# Patient Record
Sex: FEMALE | Race: WHITE | NOT HISPANIC OR LATINO | Employment: FULL TIME | URBAN - METROPOLITAN AREA
[De-identification: names, ages, dates, MRNs, and addresses within clinical notes are randomized per-mention and may not be internally consistent; named-entity substitution may affect disease eponyms.]

---

## 2018-04-24 DIAGNOSIS — M22.2X2 PATELLOFEMORAL DISORDER OF LEFT KNEE: Primary | ICD-10-CM

## 2018-04-24 DIAGNOSIS — M25.562 PAIN, JOINT, LOWER LEG, LEFT: ICD-10-CM

## 2018-05-02 ENCOUNTER — CLINICAL SUPPORT (OUTPATIENT)
Dept: REHABILITATION | Facility: HOSPITAL | Age: 53
End: 2018-05-02
Payer: COMMERCIAL

## 2018-05-02 DIAGNOSIS — M25.562 ACUTE PAIN OF LEFT KNEE: ICD-10-CM

## 2018-05-02 DIAGNOSIS — M62.81 MUSCLE WEAKNESS: ICD-10-CM

## 2018-05-02 PROCEDURE — 97110 THERAPEUTIC EXERCISES: CPT | Mod: PN

## 2018-05-02 PROCEDURE — 97161 PT EVAL LOW COMPLEX 20 MIN: CPT | Mod: PN

## 2018-05-02 NOTE — PROGRESS NOTES
Physical Therapy Evaluation    Name: Marlee Jones  Clinic Number: [id number]        Diagnosis:   Encounter Diagnoses   Name Primary?    Acute pain of left knee     Muscle weakness      Physician: Chris Garcia MD  Treatment Orders: PT Eval and Treat    Past Medical History:   Diagnosis Date    DVT (deep venous thrombosis)     Heterozygous factor V Leiden mutation      Current Outpatient Prescriptions   Medication Sig    LOCOID LIPOCREAM 0.1 % Crea AAA bid    sertraline (ZOLOFT) 25 MG tablet Take 1 tablet (25 mg total) by mouth once daily.     No current facility-administered medications for this visit.      Review of patient's allergies indicates:  No Known Allergies  Precautions: standard    Evaluation Date: 5/2/18        Subjective     Onset/ROSA: pt fell 3 weeks ago.  Walking dogs after it rained.  Her dog pulled her backwards.  She thinks she twisted her L knee.  She went to outside MD and had x-rays (no fracture or tears per pt report).  Anterior L knee pain, occasionally medially    Prior Level of Function: I with all activities  Social History: pt lives with  in 2 stiory house, but BR on 1st floor, 1 curb step to enter back door    Pt reports difficulty sleeping, typically sleeps on side    History of Present Illness: Marlee is a 52 y.o. female that presents to [facility] secondary to L knee pain s/p fall 3 weeks ago.     Pain: current 2/10, worst 8/10, best 1/10  Radicular symptoms: pt denies  Aggravating factors: stairs, sit to stand, squatting  Easing factors: advil, ice (1-2x/day)    Pts goals: return to [employer], yoga, stairs without pain.  Be able to susan shoes, get in/out of tub          Objective     Observation: pt is pleasant and cooperative      Range of Motion:   Knee Left active Right Active   Flexion 131 140   Extension 3 hyperext 6 hyperext           Lower Extremity Strength  Right LE  Left LE    Knee extension: 5/5 Knee extension: 3/5   Knee flexion: 5/5 Knee  flexion: 4+/5   Hip flexion: 5/5 Hip flexion: 4+/5   Hip extension:  4/5 Hip extension: 4/5   Hip abduction: 4+/5 Hip abduction: 3+/5   Hip adduction: 5/5 Hip adduction 5/5   Ankle dorsiflexion: 5/5 Ankle dorsiflexion: 5/5   Ankle plantarflexion: 5/5 Ankle plantarflexion: 5/5           Special Tests:   Left Right   Valgus Stress Test - -   Varus Stress test - -       Function:  - Squat: pain, crepitus, R weight shift   - Sit <--> Stand:I, but pt with pain and report of stiffness   - Bed Mobility: I        Joint Mobility:   Patellar WNL B        Sensation: grossly intact to light touch    Flexibility:    Josephine's test: R =( -) ; L = (-)     Edema: min edema to L knee    Functional Limitations Reports - G Codes  Category: mobility  CMS Impairment/Limitation/Restriction for FOTO Lower Leg (w/o Knee) Survey  Status Limitation G-Code CMS Severity Modifier  Intake 64% 36% Current Status CJ - At least 20 percent but less than 40 percent  Predicted 82% 18% Goal Status+ CI - At least 1 percent but less than 20 percent    PT Evaluation Completed? Yes  Discussed Plan of Care with patient: Yes    TREATMENT:  Marlee instructed in and performed therapeutic exercises to develop strength and endurance, flexibility for 15 minutes including:  SL clams x10 RTB  Bridges with Y sport loop for hip abd iso x10   pilates stepper 2 springs L3 2x10 (standing on 2in step)  Side steps with Y sport loop 2 laps  SLR x10     Pt. Received cold pack x 10 min. To L knee. Following treatment.    HEP provided: pt given verbal and written instruction for all ex listed above  Instructed pt. Regarding: Proper technique with all exercises. Pt demo good understanding of the education provided. Marlee demonstrated good return demonstration of activities.      Assessment     This is a 52 y.o. female referred to outpatient physical therapy and presents with a medical diagnosis of Patellofemoral disorder of left knee and demonstrates limitations as described  in the problem list. Pt will benefit from physcial therapy services in order to maximize pain free and/or functional use of left knee. The following goals were discussed with the patient and patient is in agreement with them as to be addressed in the treatment plan.     History  Co-morbidities and personal factors that may impact the plan of care Examination  Body Structures and Functions, activity limitations and participation restrictions that may impact the plan of care    Clinical Presentation   Co-morbidities:   none        Personal Factors:   no deficits Body Regions:   lower extremities    Body Systems:    ROM  strength  balance  gait  transfers  transitions            Participation Restrictions:   Stairs, squats     Activity limitations:   Learning and applying knowledge  no deficits    General Tasks and Commands  no deficits    Communication  no deficits    Mobility  no deficits    Self care  dressing    Domestic Life  doing house work (cleaning house, washing dishes, laundry)    Interactions/Relationships  no deficits    Life Areas  no deficits    Community and Social Life  community life  recreation and leisure         stable and uncomplicated                      low      Decision Making/ Complexity Score:  low         Medical necessity is demonstrated by the following IMPAIRMENTS/PROBLEM LIST:   1)Increase in L knee pain level limiting function   2)decreased LE strength   3)decreased L knee ROM   4)difficulty sleeping   5)Lack of HEP    GOALS:   Short Term Goals:  4 weeks  1. Pt will report decreased knee pain to 4/10 in order to increase tolerance for stairs.  2. Pt will increase L knee flexion ROM by at least 5-10 degrees in order to show improved functional mobility.  3. Increased LE strength by 1/3 muscle grade in all deficient planes for increased ease with sit to stand  4. Pt will report ability to sleep through the night without pain for increased tolerance for daily activities.  5. Pt will be  independent and consistent with issued HEP in order to show carryover between therapy sessions.    Long Term Goals: 12 weeks  6. Pt will report decreased knee pain to 0-2/10 in order to increase tolerance for exercising at gym.  7. Pt will increase L knee flexion ROM by at least 5-10 degrees in order to show improved functional mobility.  8. Increased LE strength to 4+/5 to 5/5 in all deficient planes for increased ease with floor to stand transfers.  9. Pt will report ability to sleep through the night without pain for increased tolerance for daily activities.  10. Pt will be independent and consistent with issued HEP in order to show carryover between therapy sessions.  11. Pt will perform floor to stand transfer independently without L knee pain.  Plan     Pt will be treated by physical therapy 1-3 times a week to every 2 weeks for 12 weeks for Pt Education, HEP, therapeutic exercises, neuromuscular re-education, joint mobilizations, dry needling, modalities prn to achieve established goals. Marlee may at times be seen by a PTA as part of the Rehab Team.     Cont PT for  12   weeks.       I certify the need for these services furnished under this plan of treatment and while under my care.______________________________ Physician/Referring Practitioner  Date of Signature

## 2018-05-03 NOTE — PLAN OF CARE
Physical Therapy Evaluation    Name: Marlee Jones  Clinic Number: [id number]        Diagnosis:   Encounter Diagnoses   Name Primary?    Acute pain of left knee     Muscle weakness      Physician: Chris Garcia MD  Treatment Orders: PT Eval and Treat    Past Medical History:   Diagnosis Date    DVT (deep venous thrombosis)     Heterozygous factor V Leiden mutation      Current Outpatient Prescriptions   Medication Sig    LOCOID LIPOCREAM 0.1 % Crea AAA bid    sertraline (ZOLOFT) 25 MG tablet Take 1 tablet (25 mg total) by mouth once daily.     No current facility-administered medications for this visit.      Review of patient's allergies indicates:  No Known Allergies  Precautions: standard    Evaluation Date: 5/2/18        Subjective     Onset/ROSA: pt fell 3 weeks ago.  Walking dogs after it rained.  Her dog pulled her backwards.  She thinks she twisted her L knee.  She went to outside MD and had x-rays (no fracture or tears per pt report).  Anterior L knee pain, occasionally medially    Prior Level of Function: I with all activities  Social History: pt lives with  in 2 stiory house, but BR on 1st floor, 1 curb step to enter back door    Pt reports difficulty sleeping, typically sleeps on side    History of Present Illness: Marlee is a 52 y.o. female that presents to [facility] secondary to L knee pain s/p fall 3 weeks ago.     Pain: current 2/10, worst 8/10, best 1/10  Radicular symptoms: pt denies  Aggravating factors: stairs, sit to stand, squatting  Easing factors: advil, ice (1-2x/day)    Pts goals: return to [employer], yoga, stairs without pain.  Be able to susan shoes, get in/out of tub          Objective     Observation: pt is pleasant and cooperative      Range of Motion:   Knee Left active Right Active   Flexion 131 140   Extension 3 hyperext 6 hyperext           Lower Extremity Strength  Right LE  Left LE    Knee extension: 5/5 Knee extension: 3/5   Knee flexion: 5/5 Knee  flexion: 4+/5   Hip flexion: 5/5 Hip flexion: 4+/5   Hip extension:  4/5 Hip extension: 4/5   Hip abduction: 4+/5 Hip abduction: 3+/5   Hip adduction: 5/5 Hip adduction 5/5   Ankle dorsiflexion: 5/5 Ankle dorsiflexion: 5/5   Ankle plantarflexion: 5/5 Ankle plantarflexion: 5/5           Special Tests:   Left Right   Valgus Stress Test - -   Varus Stress test - -       Function:  - Squat: pain, crepitus, R weight shift   - Sit <--> Stand:I, but pt with pain and report of stiffness   - Bed Mobility: I        Joint Mobility:   Patellar WNL B        Sensation: grossly intact to light touch    Flexibility:    Josephine's test: R =( -) ; L = (-)     Edema: min edema to L knee    Functional Limitations Reports - G Codes  Category: mobility  CMS Impairment/Limitation/Restriction for FOTO Lower Leg (w/o Knee) Survey  Status Limitation G-Code CMS Severity Modifier  Intake 64% 36% Current Status CJ - At least 20 percent but less than 40 percent  Predicted 82% 18% Goal Status+ CI - At least 1 percent but less than 20 percent    PT Evaluation Completed? Yes  Discussed Plan of Care with patient: Yes    TREATMENT:  Marlee instructed in and performed therapeutic exercises to develop strength and endurance, flexibility for 15 minutes including:  SL clams x10 RTB  Bridges with Y sport loop for hip abd iso x10   pilates stepper 2 springs L3 2x10 (standing on 2in step)  Side steps with Y sport loop 2 laps  SLR x10     Pt. Received cold pack x 10 min. To L knee. Following treatment.    HEP provided: pt given verbal and written instruction for all ex listed above  Instructed pt. Regarding: Proper technique with all exercises. Pt demo good understanding of the education provided. Marlee demonstrated good return demonstration of activities.      Assessment     This is a 52 y.o. female referred to outpatient physical therapy and presents with a medical diagnosis of Patellofemoral disorder of left knee and demonstrates limitations as described  in the problem list. Pt will benefit from physcial therapy services in order to maximize pain free and/or functional use of left knee. The following goals were discussed with the patient and patient is in agreement with them as to be addressed in the treatment plan.     History  Co-morbidities and personal factors that may impact the plan of care Examination  Body Structures and Functions, activity limitations and participation restrictions that may impact the plan of care    Clinical Presentation   Co-morbidities:   none        Personal Factors:   no deficits Body Regions:   lower extremities    Body Systems:    ROM  strength  balance  gait  transfers  transitions            Participation Restrictions:   Stairs, squats     Activity limitations:   Learning and applying knowledge  no deficits    General Tasks and Commands  no deficits    Communication  no deficits    Mobility  no deficits    Self care  dressing    Domestic Life  doing house work (cleaning house, washing dishes, laundry)    Interactions/Relationships  no deficits    Life Areas  no deficits    Community and Social Life  community life  recreation and leisure         stable and uncomplicated                      low      Decision Making/ Complexity Score:  low         Medical necessity is demonstrated by the following IMPAIRMENTS/PROBLEM LIST:   1)Increase in L knee pain level limiting function   2)decreased LE strength   3)decreased L knee ROM   4)difficulty sleeping   5)Lack of HEP    GOALS:   Short Term Goals:  4 weeks  1. Pt will report decreased knee pain to 4/10 in order to increase tolerance for stairs.  2. Pt will increase L knee flexion ROM by at least 5-10 degrees in order to show improved functional mobility.  3. Increased LE strength by 1/3 muscle grade in all deficient planes for increased ease with sit to stand  4. Pt will report ability to sleep through the night without pain for increased tolerance for daily activities.  5. Pt will be  independent and consistent with issued HEP in order to show carryover between therapy sessions.    Long Term Goals: 12 weeks  6. Pt will report decreased knee pain to 0-2/10 in order to increase tolerance for exercising at gym.  7. Pt will increase L knee flexion ROM by at least 5-10 degrees in order to show improved functional mobility.  8. Increased LE strength to 4+/5 to 5/5 in all deficient planes for increased ease with floor to stand transfers.  9. Pt will report ability to sleep through the night without pain for increased tolerance for daily activities.  10. Pt will be independent and consistent with issued HEP in order to show carryover between therapy sessions.  11. Pt will perform floor to stand transfer independently without L knee pain.  Plan     Pt will be treated by physical therapy 1-3 times a week to every 2 weeks for 12 weeks for Pt Education, HEP, therapeutic exercises, neuromuscular re-education, joint mobilizations, dry needling, modalities prn to achieve established goals. Marlee may at times be seen by a PTA as part of the Rehab Team.     Cont PT for  12   weeks.       I certify the need for these services furnished under this plan of treatment and while under my care.______________________________ Physician/Referring Practitioner  Date of Signature

## 2018-05-07 ENCOUNTER — CLINICAL SUPPORT (OUTPATIENT)
Dept: REHABILITATION | Facility: HOSPITAL | Age: 53
End: 2018-05-07
Payer: COMMERCIAL

## 2018-05-07 DIAGNOSIS — M62.81 MUSCLE WEAKNESS: ICD-10-CM

## 2018-05-07 DIAGNOSIS — M25.562 ACUTE PAIN OF LEFT KNEE: ICD-10-CM

## 2018-05-07 PROCEDURE — 97110 THERAPEUTIC EXERCISES: CPT | Mod: PN

## 2018-05-07 NOTE — PROGRESS NOTES
Name: Marlee Jones  Clinic Number: [id number]  Date of Treatment: 05/07/2018   Diagnosis:   Encounter Diagnoses   Name Primary?    Acute pain of left knee     Muscle weakness        Visit number: 2  Start date: 5/2/2018  POC End date: 7/25/2018    Time in: 2:00  Time Out: 3:08  Total Treatment Time: 55  1:1 Time: 30    Precautions: standard    Subjective:    Marlee reports slow improvement of symptoms into the L LE and knee. She states she would like her progress to be faster, but has noticed an improvement. Most discomfort with sleeping and stairs/squatting. Patient reports their pain to be aching/10 on a 0-10 scale with 0 being no pain and 10 being the worst pain imaginable.    Objective    Marlee received therapeutic exercises to develop strength, endurance and flexibility for 55 minutes including:     SL clams 2x10 RTB  Bridges with RTB for hip abd iso 3x10   pilates stepper 2 springs L4 2x10 each (standing on 2in step)  Side steps with Y sport loop 2 laps  SLR 2x10  ADDED:  SL hip abd x10 each  Shuttle B LE 2.5 bands 2x10  Shuttle 2-1 alternating  Sit-stand from chair + 2 Airex pads x 10     Pt. Received cold pack x 10 min. To L knee. Following treatment.    Written Home Exercises Provided: Patient to continue with current written HEP. Educated patient on proper body mechanics and posture with sit to stand transfers including foot position and sitting on edge of chair.  Pt demo good understanding of the education provided. Marlee demonstrated good return demonstration of activities.     Assessment:     Marlee tolerated treatment well this date overall. No onset of L knee pain except with end range of sit to stand from chair. Mild apprehension with shuttle exercises, improved once familiar with motion of the exercises. Improved quad recruitment with shuttle press, weakness during eccentric motion. Patient will benefit from continued strengthening.  Pt will continue to benefit from skilled PT  intervention to address the remaining functional deficits, maximize pt's level of independence in the home and community environment, as well as Pain limits function of effected part for some activities, Unable to participate fully in daily activities and Weakness.  Patient is making good progress towards established goals.    Short Term Goals:  4 weeks  1. Pt will report decreased knee pain to 4/10 in order to increase tolerance for stairs.  2. Pt will increase L knee flexion ROM by at least 5-10 degrees in order to show improved functional mobility.  3. Increased LE strength by 1/3 muscle grade in all deficient planes for increased ease with sit to stand  4. Pt will report ability to sleep through the night without pain for increased tolerance for daily activities.  5. Pt will be independent and consistent with issued HEP in order to show carryover between therapy sessions.     Long Term Goals: 12 weeks  6. Pt will report decreased knee pain to 0-2/10 in order to increase tolerance for exercising at gym.  7. Pt will increase L knee flexion ROM by at least 5-10 degrees in order to show improved functional mobility.  8. Increased LE strength to 4+/5 to 5/5 in all deficient planes for increased ease with floor to stand transfers.  9. Pt will report ability to sleep through the night without pain for increased tolerance for daily activities.  10. Pt will be independent and consistent with issued HEP in order to show carryover between therapy sessions.  Pt will perform floor to stand transfer independently without L knee pain.    Plan:  Continue with established Plan of Care towards PT goals.

## 2018-05-09 ENCOUNTER — CLINICAL SUPPORT (OUTPATIENT)
Dept: REHABILITATION | Facility: HOSPITAL | Age: 53
End: 2018-05-09
Payer: COMMERCIAL

## 2018-05-09 DIAGNOSIS — M62.81 MUSCLE WEAKNESS: ICD-10-CM

## 2018-05-09 DIAGNOSIS — M25.562 ACUTE PAIN OF LEFT KNEE: ICD-10-CM

## 2018-05-09 PROCEDURE — 97110 THERAPEUTIC EXERCISES: CPT | Mod: PN

## 2018-05-09 NOTE — PROGRESS NOTES
"Name: Marlee Jones  Clinic Number: [id number]  Date of Treatment: 05/09/2018   Diagnosis:   Encounter Diagnoses   Name Primary?    Acute pain of left knee     Muscle weakness        Visit number: 3  Start date: 5/2/2018  POC End date: 7/25/2018    Time in: 2:03  Time Out: 3:14  Total Treatment Time: 60  1:1 Time: 60    Precautions: standard    Subjective:    Marlee reports continued improvement of symptoms in the L knee. Had some soreness following previous session yet improved next day. Got up twice from chair today without thinking about knee pain or discomfort. Patient reports their pain to be less aching/10 on a 0-10 scale with 0 being no pain and 10 being the worst pain imaginable.    Objective    Marlee received therapeutic exercises to develop strength, endurance and flexibility for 55 minutes including:     SL clams 2x10 RTB  Bridges with RTB for hip abd iso 3x10   Supine active L heel slides with slider x 15  Quad set with towel roll under knee for VMO 15 x 5 sec  SAQ over 6" roll x 10 (discomfort sub-patella)  SLR 2x10 (towel roll under knee for quad activation)  SL hip abd 2x10 each  Prone SLR (pillow under hips  pilates stepper 2 springs L4 2x10 each (standing on 2in step)  Side steps with Y sport loop 2 laps  Shuttle B LE 3 bands 2x10  Shuttle 2-1 with 2 bands 2x10 L  Sit-stand from chair + 2 Airex pads x 10     Patient received manual techniques including medial patellar glides and STM to distal ITB x 5 min    Pt. Received cold pack x 10 min. To L knee. Following treatment.    Written Home Exercises Provided: Patient to continue with current written HEP. Educated patient on proper body mechanics and posture with sit to stand transfers including foot position and sitting on edge of chair.  Pt demo good understanding of the education provided. Marlee demonstrated good return demonstration of activities.     Assessment:     Pt demonstrated good tolerance to treatment without onset of pain or " soreness in the L knee. Mild lateral tracking of patella with quad set yet no relief with manual blocking of patella. Slight difficulty maintaining quad activation and full knee extension with SLR. Greater tolerance to sit to stands with 2 Airex pads. Patient will benefit from continued quad and hip strengthening.  Pt will continue to benefit from skilled PT intervention to address the remaining functional deficits, maximize pt's level of independence in the home and community environment, as well as Pain limits function of effected part for some activities, Unable to participate fully in daily activities and Weakness.  Patient is making good progress towards established goals.    Short Term Goals:  4 weeks  1. Pt will report decreased knee pain to 4/10 in order to increase tolerance for stairs.  2. Pt will increase L knee flexion ROM by at least 5-10 degrees in order to show improved functional mobility.  3. Increased LE strength by 1/3 muscle grade in all deficient planes for increased ease with sit to stand  4. Pt will report ability to sleep through the night without pain for increased tolerance for daily activities.  5. Pt will be independent and consistent with issued HEP in order to show carryover between therapy sessions.     Long Term Goals: 12 weeks  6. Pt will report decreased knee pain to 0-2/10 in order to increase tolerance for exercising at gym.  7. Pt will increase L knee flexion ROM by at least 5-10 degrees in order to show improved functional mobility.  8. Increased LE strength to 4+/5 to 5/5 in all deficient planes for increased ease with floor to stand transfers.  9. Pt will report ability to sleep through the night without pain for increased tolerance for daily activities.  10. Pt will be independent and consistent with issued HEP in order to show carryover between therapy sessions.  Pt will perform floor to stand transfer independently without L knee pain.    Plan:  Continue with established  Plan of Care towards PT goals.

## 2018-05-09 NOTE — PATIENT INSTRUCTIONS
Strengthening: Quadriceps Set        Tighten muscles on top of thighs by pushing knees down into surface. Hold 5 seconds.  Repeat 10 times per set. Do 2 sets per session. Do 2-3 sessions per day.   [email or web address]     Copyright © [employer]. All rights reserved.     Straight Leg Raise        Squeeze pelvic floor and hold. Tighten top of left thigh and raise leg up in the air no higher than opposite knee. Repeat 10 times per set. Do 2 sets per session. Do 1 session per day. Repeat with other leg.    Copyright © [employer]. All rights reserved.     Strengthening: Hip Abduction (Side-Lying)        Tighten muscles on front of left thigh, then lift leg slightly upward and back, keeping knee locked. Pull into buttocks should be felt. Return to start and repeat.   Repeat 10 times per set. Do 2 sets per session. Do 1 session per day. Repeat with other leg.   [email or web address]   Copyright © [employer]. All rights reserved.     Strengthening: Hip Adduction (Side-Lying)        Tighten muscles on front of right thigh, then lift leg up from surface, keeping knee locked.   Repeat 10 times per set. Do 2 sets per session. Do 1 session per day. Repeat with other leg.   [email or web address]     Strengthening: Hip Extension (Prone)        Lay on stomach with pillow under hips. Tighten muscles on front of left thigh, then lift off surface keeping knee locked. Do not allow back to arch.  Repeat 10 times per set. Do 2 sets per session. Do 1 session per day. Repeat with other leg.  [email or web address]   Copyright © [employer]. All rights reserved.

## 2018-05-15 ENCOUNTER — CLINICAL SUPPORT (OUTPATIENT)
Dept: REHABILITATION | Facility: HOSPITAL | Age: 53
End: 2018-05-15
Payer: COMMERCIAL

## 2018-05-15 DIAGNOSIS — M62.81 MUSCLE WEAKNESS: ICD-10-CM

## 2018-05-15 DIAGNOSIS — M25.562 ACUTE PAIN OF LEFT KNEE: ICD-10-CM

## 2018-05-15 PROCEDURE — 97110 THERAPEUTIC EXERCISES: CPT | Mod: PN

## 2018-05-15 NOTE — PROGRESS NOTES
"Name: Marlee Jones  Clinic Number: [id number]  Date of Treatment: 05/15/2018   Diagnosis:   Encounter Diagnoses   Name Primary?    Acute pain of left knee     Muscle weakness        Visit number: 4  Start date: 5/2/2018  POC End date: 7/25/2018    Time in: 12:00  Time Out: 1:08  Total Treatment Time: 58  1:1 Time: 58    Precautions: standard    Subjective:    Marlee reports overall improvement in the L knee, but continues with stiffness and aching occasionally. Getting up from a chair, and stiffness in the evenings are the most bothersome. States if she had to, she could probably get up off of the ground, but would like to do so in order to perform her HEP. Patient reports their pain to be 0/10 on a 0-10 scale with 0 being no pain and 10 being the worst pain imaginable.    Objective    Marlee received therapeutic exercises to develop strength, endurance and flexibility for 55 minutes including:     SL clams 3x10 RTB  Bridges with RTB for hip abd iso 3x10   Supine active L heel slides with slider x 15  Quad set with towel roll under knee 10 x 5", towel roll under ankle 10 x 5"  SAQ over 6" roll x 10 (discomfort sub-patella)  SLR 2x10 L  SL hip abd 2x10 each  Prone SLR (pillow under hips) 2 x 10 each    pilates stepper 3 springs, 2 at L4, 1 at L1, 2x10 each (standing on 2in step)  Side steps with Y sport loop 2 laps  Shuttle B LE 3 bands 2x10  Shuttle 2-1 with 2 bands 2x10 L  Sit-stand from chair + 2 Airex pads x 5, with 1 airex pad x 10    Assessed patient tolerance to getting on and off ground on blue mat, ModA with use of chair.     Patient received manual techniques including medial patellar glides and STM to distal ITB x 3 min    Pt. Received cold pack x 10 min. To L knee. Following treatment.    Written Home Exercises Provided: Patient to continue with current written HEP. Educated patient on proper body mechanics and posture with sit to stand transfers including foot position and sitting on edge of " chair.  Pt demo good understanding of the education provided. Marlee demonstrated good return demonstration of activities.     Assessment:     Marlee tolerated treatment well this date overall. Improved LE strength and stability as well as increased independence with sit-stand and strengthening exercises. Only required 1 Airex pad with sit-stand this date. Patient required mod assist getting up off ground with use of a chair, but no onset of pain was reported. Pain remains 0/10 post tx. L LE remains more weak than R LE, will benefit from continued treatment.  Pt will continue to benefit from skilled PT intervention to address the remaining functional deficits, maximize pt's level of independence in the home and community environment, as well as Pain limits function of effected part for some activities, Unable to participate fully in daily activities and Weakness.  Patient is making good progress towards established goals.    Short Term Goals:  4 weeks  1. Pt will report decreased knee pain to 4/10 in order to increase tolerance for stairs.  2. Pt will increase L knee flexion ROM by at least 5-10 degrees in order to show improved functional mobility.  3. Increased LE strength by 1/3 muscle grade in all deficient planes for increased ease with sit to stand  4. Pt will report ability to sleep through the night without pain for increased tolerance for daily activities.  5. Pt will be independent and consistent with issued HEP in order to show carryover between therapy sessions.     Long Term Goals: 12 weeks  6. Pt will report decreased knee pain to 0-2/10 in order to increase tolerance for exercising at gym.  7. Pt will increase L knee flexion ROM by at least 5-10 degrees in order to show improved functional mobility.  8. Increased LE strength to 4+/5 to 5/5 in all deficient planes for increased ease with floor to stand transfers.  9. Pt will report ability to sleep through the night without pain for increased  tolerance for daily activities.  10. Pt will be independent and consistent with issued HEP in order to show carryover between therapy sessions.  Pt will perform floor to stand transfer independently without L knee pain.    Plan:  Continue with established Plan of Care towards PT goals.

## 2018-05-17 ENCOUNTER — CLINICAL SUPPORT (OUTPATIENT)
Dept: REHABILITATION | Facility: HOSPITAL | Age: 53
End: 2018-05-17
Payer: COMMERCIAL

## 2018-05-17 DIAGNOSIS — M25.562 ACUTE PAIN OF LEFT KNEE: ICD-10-CM

## 2018-05-17 DIAGNOSIS — M62.81 MUSCLE WEAKNESS: ICD-10-CM

## 2018-05-17 PROCEDURE — 97110 THERAPEUTIC EXERCISES: CPT | Mod: PN

## 2018-05-17 PROCEDURE — 97140 MANUAL THERAPY 1/> REGIONS: CPT | Mod: PN

## 2018-05-17 NOTE — PATIENT INSTRUCTIONS
"View written instructions for additional HEP including standing gastroc stretch and seated hamstring stretch at "[email or web address]" using code: [email or web address]  "

## 2018-05-17 NOTE — PROGRESS NOTES
"Name: Marlee Jones  Clinic Number: [id number]  Date of Treatment: 05/17/2018   Diagnosis:   Encounter Diagnoses   Name Primary?    Acute pain of left knee     Muscle weakness        Visit number: 5  Start date: 5/2/2018  POC End date: 7/25/2018    Time in: 12:58  Time Out: 2:15  Total Treatment Time: 65  1:1 Time: 65    Precautions: standard    Subjective:    Marlee reports overall improvement in the L knee with continued soreness occasionally. States she is still fearful of certain motions, and would like to get back to workouts at the [employer]. Able to get on and off the ground without use of a chair or onset of L knee pain. Patient reports their pain to be 0/10 on a 0-10 scale with 0 being no pain and 10 being the worst pain imaginable.    Objective    Marlee received therapeutic exercises to develop strength, endurance and flexibility for 50 minutes including:     SL clams 3x10 RTB  Bridges with RTB for hip abd iso 3x10   Supine active L heel slides x 15  Quad set with towel roll under knee 10 x 5" and strap on foot, towel roll under ankle 15 x 5"  NP - SAQ over 6" roll x 10 (discomfort sub-patella)  SLR 2x10 L  NP - SL hip abd 2x10 each  Prone SLR (pillow under hips) 2 x 10 each    pilates stepper 3 springs, 2 at L4, 1 at L1, 2x10 each (standing on 2in step)  Side steps with Y sport loop x2 half laps (15 feet) with partial squat   NP - Shuttle B LE 3 bands 2x10  NP - Shuttle 2-1 with 2 bands 2x10 L  Sit-stand from chair + 1 x 10  Standing L gastroc stretch 3 x 20 sec  Forward step up on 4" step    Patient received manual techniques including medial and superior patellar glides and STM to distal ITB and distal patellar tendon, IASTM to proximal gastroc x 15 min    Pt. Received cold pack x 10 min. To L knee. Following treatment.    Written Home Exercises Provided: Patient to continue with current written HEP including additional standing gastroc stretch and seated HSS. See patient instructions in " "Notes section.   Pt demo good understanding of the education provided. Marlee demonstrated good return demonstration of activities.     Assessment:     Marlee demonstrated improved tolerance to exercises this date, able to perform forward step up on 4" step without pain. Slight difficulty with full quad activation during quad sets and SLR. Able to perform sit-stand with 1 Airex pad. Pain reported at 0/10 post tx. Continued L LE weakness but is making improvements. Tightness of L proximal gastroc and limited distal patellar mobility into superior glide, improved some with manual techniques.  Pt will continue to benefit from skilled PT intervention to address the remaining functional deficits, maximize pt's level of independence in the home and community environment, as well as Pain limits function of effected part for some activities, Unable to participate fully in daily activities and Weakness.    Patient is making good progress towards established goals.    Short Term Goals:  4 weeks  1. Pt will report decreased knee pain to 4/10 in order to increase tolerance for stairs.  2. Pt will increase L knee flexion ROM by at least 5-10 degrees in order to show improved functional mobility.  3. Increased LE strength by 1/3 muscle grade in all deficient planes for increased ease with sit to stand  4. Pt will report ability to sleep through the night without pain for increased tolerance for daily activities.  5. Pt will be independent and consistent with issued HEP in order to show carryover between therapy sessions.     Long Term Goals: 12 weeks  6. Pt will report decreased knee pain to 0-2/10 in order to increase tolerance for exercising at gym.  7. Pt will increase L knee flexion ROM by at least 5-10 degrees in order to show improved functional mobility.  8. Increased LE strength to 4+/5 to 5/5 in all deficient planes for increased ease with floor to stand transfers.  9. Pt will report ability to sleep through the " night without pain for increased tolerance for daily activities.  10. Pt will be independent and consistent with issued HEP in order to show carryover between therapy sessions.  Pt will perform floor to stand transfer independently without L knee pain.    Plan:  Continue with established Plan of Care towards PT goals.

## 2018-05-21 ENCOUNTER — CLINICAL SUPPORT (OUTPATIENT)
Dept: REHABILITATION | Facility: HOSPITAL | Age: 53
End: 2018-05-21
Payer: COMMERCIAL

## 2018-05-21 DIAGNOSIS — M62.81 MUSCLE WEAKNESS: ICD-10-CM

## 2018-05-21 DIAGNOSIS — M25.562 ACUTE PAIN OF LEFT KNEE: ICD-10-CM

## 2018-05-21 PROCEDURE — 97110 THERAPEUTIC EXERCISES: CPT | Mod: PN

## 2018-05-21 NOTE — PROGRESS NOTES
"Name: Marlee Jones  Clinic Number: [id number]  Date of Treatment: 05/21/2018   Diagnosis:   Encounter Diagnoses   Name Primary?    Acute pain of left knee     Muscle weakness        Visit number: 6  Start date: 5/2/2018  POC End date: 7/25/2018    Time in: 2:00  Time Out: 3:00  Total Treatment Time: 57  1:1 Time: 40    Precautions: standard    Subjective:    Marlee reports overall improvement in the L knee, still some continued soreness to L knee. Pt states her L calf feels much better since last session.  She states she has been biking, walking, and performing HEP.  Objective    Marlee instructed in and performed therapeutic exercises to develop strength, endurance and flexibility for 55 minutes including:     SL clams 2x10 G sport loop  Bridges with G sport loop for hip abd iso 2x10   NP Supine active L heel slides x 15  Quad set with towel roll under knee 10 x 5" and strap on foot, towel roll under ankle 20 x 5"  SLR 2x10 1#  L  NP - SL hip abd 2x10 each  Prone SLR (pillow under hips) 2 x 10 each 1#    NP pilates stepper 3 springs, 2 at L4, 1 at L1, 2x10 each (standing on 2in step)  Side steps with G sport loop x2 laps (knees straight)  NP - Shuttle B LE 3 bands 2x10  Shuttle 2-1 with 2.5 bands 2x10 L  NP Sit-stand from chair + 1 x 10  gastroc stretch on incline 3 x 20 sec  Forward step up on 6" step  ADDED:  Lateral step downs 4in 2x10  Pt ascended/descended 4 steps with 1 rail, reciprocal pattern, mod I (vc for control on descent)    Patient received manual techniques including superior and inferior patellar glides x2min    Pt. Received cold pack x 10 min. To L knee. Following treatment.    Written Home Exercises Provided: Patient to continue with current written HEP.  Pt demo good understanding of the education provided. Marlee demonstrated good return demonstration of activities.     Assessment:     Pt tolerated tx session well reporting 0/10 pain post tx.  She is improving with increased strength " as noted by tolerance for increased resistance for strengthening ex and tolerance for addition of lateral step downs.  Pt will continue to benefit from skilled PT intervention to address the remaining functional deficits, maximize pt's level of independence in the home and community environment, as well as Pain limits function of effected part for some activities, Unable to participate fully in daily activities and Weakness.    Patient is making good progress towards established goals.    Short Term Goals:  4 weeks  1. Pt will report decreased knee pain to 4/10 in order to increase tolerance for stairs.  2. Pt will increase L knee flexion ROM by at least 5-10 degrees in order to show improved functional mobility.  3. Increased LE strength by 1/3 muscle grade in all deficient planes for increased ease with sit to stand  4. Pt will report ability to sleep through the night without pain for increased tolerance for daily activities.  5. Pt will be independent and consistent with issued HEP in order to show carryover between therapy sessions.     Long Term Goals: 12 weeks  6. Pt will report decreased knee pain to 0-2/10 in order to increase tolerance for exercising at gym.  7. Pt will increase L knee flexion ROM by at least 5-10 degrees in order to show improved functional mobility.  8. Increased LE strength to 4+/5 to 5/5 in all deficient planes for increased ease with floor to stand transfers.  9. Pt will report ability to sleep through the night without pain for increased tolerance for daily activities.  10. Pt will be independent and consistent with issued HEP in order to show carryover between therapy sessions.  Pt will perform floor to stand transfer independently without L knee pain.    Plan:  Continue with established Plan of Care towards PT goals.

## 2018-05-23 ENCOUNTER — CLINICAL SUPPORT (OUTPATIENT)
Dept: REHABILITATION | Facility: HOSPITAL | Age: 53
End: 2018-05-23
Payer: COMMERCIAL

## 2018-05-23 DIAGNOSIS — M62.81 MUSCLE WEAKNESS: ICD-10-CM

## 2018-05-23 DIAGNOSIS — M25.562 ACUTE PAIN OF LEFT KNEE: ICD-10-CM

## 2018-05-23 PROCEDURE — 97110 THERAPEUTIC EXERCISES: CPT | Mod: PN

## 2018-05-23 NOTE — PROGRESS NOTES
"Name: Marlee Jones  Clinic Number: [id number]  Date of Treatment: 05/23/2018   Diagnosis:   Encounter Diagnoses   Name Primary?    Acute pain of left knee     Muscle weakness        Visit number: 7  Start date: 5/2/2018  POC End date: 7/25/2018    Time in: 1:55  Time Out: 2:55  Total Treatment Time: 55  1:1 Time: 55    Precautions: standard    Subjective:    Marlee reports continued improvement of symptoms to the L knee with decreased tightness to the L calf. Mild soreness around L knee. Rates current pain at 0/10 to the L knee.     Objective    Marlee instructed in and performed therapeutic exercises to develop strength, endurance and flexibility for 55 minutes including:     SL clams 2x10 G sport loop  Bridges with G sport loop for hip abd iso 3x10   Quad set with towel roll under knee 10 x 5" and strap on foot, towel roll under ankle 20 x 5"  SLR 2x10 1#  L  NP - SL hip abd 2x10 each  Prone SLR (pillow under hips) 2 x 10 each 1#    pilates stepper 3 springs, 2 at L4, 1 at L1, 2x10 each (standing on 2in step)  Side steps with G sport loop x2 laps (knees straight)  Shuttle B LE 3.5 bands 2x10  Shuttle 2-1 with 2.5 bands 2x10 L  NP Sit-stand from chair + 1 x 10  gastroc stretch on incline 3 x 20 sec  Forward step up on 6" step  Lateral step downs 4in 2x10 (cues to prevent valgus)  Pt ascended/descended 4 steps with 1 rail, reciprocal pattern, mod I (vc for control on descent)    Patient received manual techniques including superior and inferior patellar glides x2min    Pt. Received cold pack x 10 min. To L knee. Following treatment.    Written Home Exercises Provided: Patient to continue with current written HEP.  Pt demo good understanding of the education provided. Marlee demonstrated good return demonstration of activities.     Assessment:     Pt tolerated  Treatment well overall without onset of soreness or pain in to the L knee. L hip muscle fatigue reported with several exercises but no onset of " pain. L LE strength and stability continues to improve. Mild lateral patellar glide with quad sets, improved with manual techniques and education for VMO activation. Mild weakness and apprehension with eccentric quad motion, will benefit from continued LE strengthening.  Pt will continue to benefit from skilled PT intervention to address the remaining functional deficits, maximize pt's level of independence in the home and community environment, as well as Pain limits function of effected part for some activities, Unable to participate fully in daily activities and Weakness.    Patient is making good progress towards established goals.    Short Term Goals:  4 weeks  1. Pt will report decreased knee pain to 4/10 in order to increase tolerance for stairs.  2. Pt will increase L knee flexion ROM by at least 5-10 degrees in order to show improved functional mobility.  3. Increased LE strength by 1/3 muscle grade in all deficient planes for increased ease with sit to stand  4. Pt will report ability to sleep through the night without pain for increased tolerance for daily activities.  5. Pt will be independent and consistent with issued HEP in order to show carryover between therapy sessions.     Long Term Goals: 12 weeks  6. Pt will report decreased knee pain to 0-2/10 in order to increase tolerance for exercising at gym.  7. Pt will increase L knee flexion ROM by at least 5-10 degrees in order to show improved functional mobility.  8. Increased LE strength to 4+/5 to 5/5 in all deficient planes for increased ease with floor to stand transfers.  9. Pt will report ability to sleep through the night without pain for increased tolerance for daily activities.  10. Pt will be independent and consistent with issued HEP in order to show carryover between therapy sessions.  Pt will perform floor to stand transfer independently without L knee pain.    Plan:  Continue with established Plan of Care towards PT goals.

## 2018-05-28 ENCOUNTER — CLINICAL SUPPORT (OUTPATIENT)
Dept: REHABILITATION | Facility: HOSPITAL | Age: 53
End: 2018-05-28
Payer: COMMERCIAL

## 2018-05-28 DIAGNOSIS — M62.81 MUSCLE WEAKNESS: ICD-10-CM

## 2018-05-28 DIAGNOSIS — M25.562 ACUTE PAIN OF LEFT KNEE: ICD-10-CM

## 2018-05-28 PROCEDURE — 97140 MANUAL THERAPY 1/> REGIONS: CPT | Mod: PN

## 2018-05-28 PROCEDURE — 97110 THERAPEUTIC EXERCISES: CPT | Mod: PN

## 2018-05-28 NOTE — PROGRESS NOTES
"Name: Marlee Jones  Clinic Number: [id number]  Date of Treatment: 05/28/2018   Diagnosis:   Encounter Diagnoses   Name Primary?    Acute pain of left knee     Muscle weakness        Visit number: 8  Start date: 5/2/2018  POC End date: 7/25/2018    Time in: 1:00  Time Out: 1:59  Total Treatment Time: 55  1:1 Time: 55    Precautions: standard    Subjective:    Marlee reports no recent changes in symptoms to the L knee. Mild soreness and tightness to the anterior lateral aspect of the knee and distal thigh reported currently. Rates current pain at 0/10 to the L knee.     Objective    Marlee instructed in and performed therapeutic exercises to develop strength, endurance and flexibility for 47 minutes including:     SL clams 2x10 G sport loop  Bridges with G sport loop for hip abd iso 3x10   Quad set with towel roll under knee 10 x 5" and strap on foot, towel roll under ankle 20 x 5"  SLR 3x10 1#  L  NP - SL hip abd 2x10 each  Prone SLR (pillow under hips) 2 x 10 each 1#    pilates stepper 3 springs, 2 at L4, 1 at L2, 2x10 each (standing on 2in step)  Side steps with G sport loop x2 laps (knees straight)  Shuttle B LE 3.5 bands 2x10  Shuttle 2-1 with 2.5 bands 2x10 L  NP Sit-stand from chair + 1 x 10  gastroc stretch on incline 3 x 20 sec  Forward step up on 6" step with contralateral hip flex 2x10  Lateral step downs 4in 2x10 (cues to prevent valgus)  Pt ascended/descended 4 steps with 1 rail, reciprocal pattern, mod I (vc for control on descent)  Patient educated in self-patellar mobilizations for superior glide    Patient received manual techniques including superior and inferior patellar glides, STM to B fat pads on anterior knee, distal lateral ITB and quad, distal patellar tendon x8min    Pt. Received cold pack x 10 min. To L knee. Following treatment.    Written Home Exercises Provided: Patient to continue with current written HEP, instruction for self-mobilizations of L patella for superior glide.  Pt " perla good understanding of the education provided. Marlee demonstrated good return demonstration of activities.     Assessment:     Pt tolerated treatment well this date overall without onset of pain or soreness, only muscle fatigue reported. Cues to prevent mild valgus with lateral step downs, mild pronation of L foot noted. Tenderness to distal patellar tendon with manual techniques which improved patellar mobility. Cues occasionally with quad sets for VMO activation. Pt will benefit from continued LE strengthening.  Pt will continue to benefit from skilled PT intervention to address the remaining functional deficits, maximize pt's level of independence in the home and community environment, as well as Pain limits function of effected part for some activities, Unable to participate fully in daily activities and Weakness.    Patient is making good progress towards established goals.    Short Term Goals:  4 weeks  1. Pt will report decreased knee pain to 4/10 in order to increase tolerance for stairs.  2. Pt will increase L knee flexion ROM by at least 5-10 degrees in order to show improved functional mobility.  3. Increased LE strength by 1/3 muscle grade in all deficient planes for increased ease with sit to stand  4. Pt will report ability to sleep through the night without pain for increased tolerance for daily activities.  5. Pt will be independent and consistent with issued HEP in order to show carryover between therapy sessions.     Long Term Goals: 12 weeks  6. Pt will report decreased knee pain to 0-2/10 in order to increase tolerance for exercising at gym.  7. Pt will increase L knee flexion ROM by at least 5-10 degrees in order to show improved functional mobility.  8. Increased LE strength to 4+/5 to 5/5 in all deficient planes for increased ease with floor to stand transfers.  9. Pt will report ability to sleep through the night without pain for increased tolerance for daily activities.  10. Pt will  be independent and consistent with issued HEP in order to show carryover between therapy sessions.  Pt will perform floor to stand transfer independently without L knee pain.    Plan:  Continue with established Plan of Care towards PT goals.

## 2018-05-30 ENCOUNTER — CLINICAL SUPPORT (OUTPATIENT)
Dept: REHABILITATION | Facility: HOSPITAL | Age: 53
End: 2018-05-30
Payer: COMMERCIAL

## 2018-05-30 DIAGNOSIS — M25.562 ACUTE PAIN OF LEFT KNEE: ICD-10-CM

## 2018-05-30 DIAGNOSIS — M62.81 MUSCLE WEAKNESS: ICD-10-CM

## 2018-05-30 PROCEDURE — 97110 THERAPEUTIC EXERCISES: CPT | Mod: PN

## 2018-05-30 NOTE — PROGRESS NOTES
"Name: Marlee Jones  Clinic Number: [id number]  Date of Treatment: 05/30/2018   Diagnosis:   Encounter Diagnoses   Name Primary?    Acute pain of left knee     Muscle weakness          Start date: 5/2/2018  POC End date: 7/25/2018    Time in: 1:00  Time Out: 2:00  Total Treatment Time: 55  1:1 Time: 55  Visit number: 9    Precautions: standard    Subjective:    Marlee reports 0/10 pain to L knee today.      Objective  L knee ROM: 2 hyperextension, 140 flexion    Marlee instructed in and performed therapeutic exercises to develop strength, endurance and flexibility for 50 minutes including:     SL clams 2x10 G sport loop  Bridges with G sport loop for hip abd iso 3x10   Quad set with towel roll under knee 10 x 5" and strap on foot, towel roll under ankle 20 x 5"  SLR 3x10 1#  L  NP - SL hip abd 2x10 each  NP Prone SLR (pillow under hips) 2 x 10 each 1#    pilates stepper 3 springs, 2 at L4, 1 at L2, 2x10 each (standing on 2in step)  Side steps with G sport loop x2 laps (knees straight)  Shuttle B LE 3.5 bands 2x10  Shuttle 2-1 with 2.5 bands 2x10 L  Sit-stand from chair + 1 foam x 10 (vc for equal WB)  gastroc stretch on incline 3 x 20 sec  Forward step up on 6" step with contralateral hip flex 2x10  Lateral step downs 4in 2x10 (cues for increased knee flexion to avoid hip dropping on R)  Pt ascended/descended 4 steps with 1 rail x3 trials, reciprocal pattern, mod I (vc for control on descent)    Patient received manual techniques including superior and inferior patellar glides, STM to B fat pads on anterior knee, distal patellar tendon x5min    Pt. Received cold pack x 10 min. To L knee. Following treatment.    Written Home Exercises Provided: Patient to continue with current written HEP, instruction for self-mobilizations of L patella for superior glide.  Pt demo good understanding of the education provided. Marlee demonstrated good return demonstration of activities.     Assessment:     Pt tolerated " treatment well reporting 0/10 pain to L knee.  She presents with improved patella glides in all directions and able to demonstrate good quad contraction.  She continues to have most difficulty with lateral step downs and sit to stand due to decreased quad control, but strength continues to improve.  Pt will continue to benefit from skilled PT intervention to address the remaining functional deficits, maximize pt's level of independence in the home and community environment, as well as Pain limits function of effected part for some activities, Unable to participate fully in daily activities and Weakness.    Patient is making good progress towards established goals.    Short Term Goals:  4 weeks  1. Pt will report decreased knee pain to 4/10 in order to increase tolerance for stairs.  2. Pt will increase L knee flexion ROM by at least 5-10 degrees in order to show improved functional mobility.  3. Increased LE strength by 1/3 muscle grade in all deficient planes for increased ease with sit to stand  4. Pt will report ability to sleep through the night without pain for increased tolerance for daily activities.  5. Pt will be independent and consistent with issued HEP in order to show carryover between therapy sessions.     Long Term Goals: 12 weeks  6. Pt will report decreased knee pain to 0-2/10 in order to increase tolerance for exercising at gym.  7. Pt will increase L knee flexion ROM by at least 5-10 degrees in order to show improved functional mobility.  8. Increased LE strength to 4+/5 to 5/5 in all deficient planes for increased ease with floor to stand transfers.  9. Pt will report ability to sleep through the night without pain for increased tolerance for daily activities.  10. Pt will be independent and consistent with issued HEP in order to show carryover between therapy sessions.  Pt will perform floor to stand transfer independently without L knee pain.    Plan:  Continue with established Plan of Care  towards PT goals.

## 2018-06-04 ENCOUNTER — CLINICAL SUPPORT (OUTPATIENT)
Dept: REHABILITATION | Facility: HOSPITAL | Age: 53
End: 2018-06-04
Payer: COMMERCIAL

## 2018-06-04 DIAGNOSIS — M62.81 MUSCLE WEAKNESS: ICD-10-CM

## 2018-06-04 DIAGNOSIS — M25.562 ACUTE PAIN OF LEFT KNEE: ICD-10-CM

## 2018-06-04 PROCEDURE — 97110 THERAPEUTIC EXERCISES: CPT | Mod: PN

## 2018-06-04 NOTE — PROGRESS NOTES
"Name: Marlee Jones  Clinic Number: [id number]  Date of Treatment: 06/04/2018   Diagnosis:   Encounter Diagnoses   Name Primary?    Acute pain of left knee     Muscle weakness          Start date: 5/2/2018  POC End date: 7/25/2018    Time in: 2:00  Time Out: 3:00  Total Treatment Time: 55  1:1 Time: 55  Visit number: 10    Precautions: standard    Subjective:    Marlee reports 0/10 pain to L knee today.      Objective  L knee ROM: 2 hyperextension, 140 flexion    Lower Extremity Strength  Right LE  Left LE    Knee extension: 5/5 Knee extension: 4/5   Knee flexion: 5/5 Knee flexion: 5/5   Hip flexion: 5/5 Hip flexion: 4+/5   Hip extension:  4+/5 Hip extension: 4+/5   Hip abduction: 5/5 Hip abduction: 5/5   Hip adduction: 5/5 Hip adduction 5/5   Ankle dorsiflexion: 5/5 Ankle dorsiflexion: 5/5   Ankle plantarflexion: 5/5 Ankle plantarflexion: 5/5         Marlee instructed in and performed therapeutic exercises to develop strength, endurance and flexibility for 50 minutes including:     SL clams 2x10 G sport loop  Bridges with G sport loop for hip abd iso 3x10   NP Quad set with towel roll under knee 10 x 5" and strap on foot, towel roll under ankle 20 x 5"  NP SLR 3x10 1#  L  NP - SL hip abd 2x10 each  NP Prone SLR (pillow under hips) 2 x 10 each 1#    pilates stepper 3 springs, 2 at L4, 1 at L3, 2x10 each (standing on 2in step)  Side steps with G sport loop x2 laps (knees straight)   Shuttle B LE 4 bands 2x10  Shuttle 2-1 with 3 bands 2x10 L  Sit-stand from raised mat 2 x 10 (mirror for visual cues for equal WB)  gastroc stretch on incline 3 x 20 sec  Forward step up on 6" step with contralateral hip flex 2x10  Lateral step downs 4in 2x10 (cues for increased knee flexion to avoid hip dropping on R)  Pt ascended/descended 4 steps with 1 rail x3 trials, reciprocal pattern, mod I (vc for control on descent)    Patient received manual techniques including superior and inferior patellar glides, IASTM to L gastroc " x5min    Pt. Received cold pack x 10 min. To L knee. Following treatment.    Written Home Exercises Provided: Patient to continue with current written HEP, instruction for self-mobilizations of L patella for superior glide.  Pt demo good understanding of the education provided. Marlee demonstrated good return demonstration of activities.     Assessment:     Pt tolerated treatment well reporting 0/10 pain to L knee.  Pt demonstrated improved L knee ROM and increased L LE strength.  She continues to have most difficulty with stairs, sit to stand and transitioning between knee flexion and extension.  Pt demonstrated improved form for lateral step ups today, but they continue to remain challenging.  Pt will continue to benefit from skilled PT intervention to address the remaining functional deficits, maximize pt's level of independence in the home and community environment, as well as Pain limits function of effected part for some activities, Unable to participate fully in daily activities and Weakness.    Patient is making good progress towards established goals.    Short Term Goals:  4 weeks  1. Pt will report decreased knee pain to 4/10 in order to increase tolerance for stairs.(met)  2. Pt will increase L knee flexion ROM by at least 5-10 degrees in order to show improved functional mobility. (met)  3. Increased LE strength by 1/3 muscle grade in all deficient planes for increased ease with sit to stand (met)  4. Pt will report ability to sleep through the night without pain for increased tolerance for daily activities. (met)  5. Pt will be independent and consistent with issued HEP in order to show carryover between therapy sessions.(met)     Long Term Goals: 12 weeks (progressing)  6. Pt will report decreased knee pain to 0-2/10 in order to increase tolerance for exercising at gym. (not yet met)  7. Pt will increase L knee flexion ROM by at least 5-10 degrees in order to show improved functional mobility.  (met)  8. Increased LE strength to 4+/5 to 5/5 in all deficient planes for increased ease with floor to stand transfers. (partially met)  9. Pt will report ability to sleep through the night without pain for increased tolerance for daily activities. (met)  10. Pt will be independent and consistent with issued HEP in order to show carryover between therapy sessions.  Pt will perform floor to stand transfer independently without L knee pain. (not yet met)    Unmet goals continue to remain appropriate within 8 weeks.    Plan:  Continue with established Plan of Care towards PT goals.     PT/PTA met face to face to discuss patient's treatment plan and progress towards established goals.  Treatment will be continued as described in initial report/eval and progress notes.  Patient will be seen by physical therapist every sixth visit and minimally once per month.

## 2018-06-06 ENCOUNTER — CLINICAL SUPPORT (OUTPATIENT)
Dept: REHABILITATION | Facility: HOSPITAL | Age: 53
End: 2018-06-06
Payer: COMMERCIAL

## 2018-06-06 DIAGNOSIS — M25.562 ACUTE PAIN OF LEFT KNEE: ICD-10-CM

## 2018-06-06 DIAGNOSIS — M62.81 MUSCLE WEAKNESS: ICD-10-CM

## 2018-06-06 PROCEDURE — 97140 MANUAL THERAPY 1/> REGIONS: CPT | Mod: PN

## 2018-06-06 PROCEDURE — 97110 THERAPEUTIC EXERCISES: CPT | Mod: PN

## 2018-06-06 NOTE — PROGRESS NOTES
"Name: Marlee Jones  Clinic Number: [id number]  Date of Treatment: 06/06/2018   Diagnosis:   Encounter Diagnoses   Name Primary?    Acute pain of left knee     Muscle weakness          Start date: 5/2/2018  POC End date: 7/25/2018    Time in: 1:57  Time Out: 3:11  Total Treatment Time: 58  1:1 Time: 58  Visit number: 11    Precautions: standard    Subjective:    Marlee reports 0/10 pain to L knee today.  States she has been practicing her sit-stands and other exercises at home, but still needs a use of a small cushion.    Objective    Marlee instructed in and performed therapeutic exercises to develop strength, endurance and flexibility for 50 minutes including:     SL clams 2x10 G sport loop  Bridges with G sport loop for hip abd iso 3x10   Quad set  10 x 5" with tactile cues for VMO activation  SLR 3x10 1#  L  NP - SL hip abd 2x10 each  Prone SLR (pillow under hips) 2 x 10 each 1#    pilates stepper 3 springs, 2 at L4, 1 at L3, 2x10 each (standing on 2in step)  Side steps with G sport loop x2 laps (knees straight)   Shuttle B LE 4 bands 3x10  Shuttle 2-1 with 3 bands 2x10 L  Sit-stand from raised mat 2 x 10 (mirror for visual cues for equal WB)  gastroc stretch on incline 3 x 20 sec  Forward step up on 6" step with contralateral hip flex 2x10  Lateral step downs 4in 2x10 (cues for increased knee flexion to avoid hip dropping on R)  Pt ascended/descended 4 steps with 1 rail x3 trials, reciprocal pattern, mod I (vc for control on descent)    Patient received manual techniques including superior and inferior patellar glides, STM to L gastroc x8min    Pt. Received cold pack x 10 min. To L knee. Following treatment.    Written Home Exercises Provided: Patient to continue with current written HEP, instruction for self-mobilizations of L patella for superior glide.  Pt demo good understanding of the education provided. Marlee demonstrated good return demonstration of activities.     Assessment:     Pt " demonstrated good tolerance to treatment without onset of pain or soreness. She reported 0/10 pain to L knee post tx. Mild limitation of superior patellar glide and slight decrease of VMO activation of L quad this date, improved following manual techniques and tactile cues with quad sets. Pt continues to have most difficulty with sit to stand. Improved LE strength and stability with most exercises yet continues with mild strength limitations with functional movements including eccentric knee flexion and sit-stand transfers.   Pt will continue to benefit from skilled PT intervention to address the remaining functional deficits, maximize pt's level of independence in the home and community environment, as well as Pain limits function of effected part for some activities, Unable to participate fully in daily activities and Weakness.    Patient is making good progress towards established goals.    Short Term Goals:  4 weeks  1. Pt will report decreased knee pain to 4/10 in order to increase tolerance for stairs.(met)  2. Pt will increase L knee flexion ROM by at least 5-10 degrees in order to show improved functional mobility. (met)  3. Increased LE strength by 1/3 muscle grade in all deficient planes for increased ease with sit to stand (met)  4. Pt will report ability to sleep through the night without pain for increased tolerance for daily activities. (met)  5. Pt will be independent and consistent with issued HEP in order to show carryover between therapy sessions.(met)     Long Term Goals: 12 weeks (progressing)  6. Pt will report decreased knee pain to 0-2/10 in order to increase tolerance for exercising at gym. (not yet met)  7. Pt will increase L knee flexion ROM by at least 5-10 degrees in order to show improved functional mobility. (met)  8. Increased LE strength to 4+/5 to 5/5 in all deficient planes for increased ease with floor to stand transfers. (partially met)  9. Pt will report ability to sleep through  the night without pain for increased tolerance for daily activities. (met)  10. Pt will be independent and consistent with issued HEP in order to show carryover between therapy sessions.  Pt will perform floor to stand transfer independently without L knee pain. (not yet met)    Unmet goals continue to remain appropriate within 8 weeks.    Plan:  Continue with established Plan of Care towards PT goals.     PT/PTA met face to face to discuss patient's treatment plan and progress towards established goals.  Treatment will be continued as described in initial report/eval and progress notes.  Patient will be seen by physical therapist every sixth visit and minimally once per month.

## 2018-06-11 ENCOUNTER — CLINICAL SUPPORT (OUTPATIENT)
Dept: REHABILITATION | Facility: HOSPITAL | Age: 53
End: 2018-06-11
Payer: COMMERCIAL

## 2018-06-11 DIAGNOSIS — M25.562 ACUTE PAIN OF LEFT KNEE: ICD-10-CM

## 2018-06-11 DIAGNOSIS — M62.81 MUSCLE WEAKNESS: ICD-10-CM

## 2018-06-11 PROCEDURE — 97110 THERAPEUTIC EXERCISES: CPT | Mod: PN

## 2018-06-11 PROCEDURE — 97140 MANUAL THERAPY 1/> REGIONS: CPT | Mod: PN

## 2018-06-11 NOTE — PROGRESS NOTES
"Name: Marlee Jones  Clinic Number: [id number]  Date of Treatment: 06/11/2018   Diagnosis:   Encounter Diagnoses   Name Primary?    Acute pain of left knee     Muscle weakness          Start date: 5/2/2018  POC End date: 7/25/2018    Time in: 1:59  Time Out: 3:10  Total Treatment Time: 58  1:1 Time: 58  Visit number: 12    Precautions: standard    Subjective:    Marlee reports 0/10 pain to L knee today. States she feels and overall improvement, but still has limitations with getting up from sitting, deep squatting, and kneeling on the L knee.    Objective    Marlee instructed in and performed therapeutic exercises to develop strength, endurance and flexibility for 50 minutes including:     SL clams 2x10 G sport loop  Bridges with G sport loop for hip abd iso 3x10   Quad set  10 x 5" with tactile cues for VMO activation  SLR 3x10 1#  L  NP - SL hip abd 2x10 each  Prone SLR (pillow under hips) 2 x 10 each 1#    pilates stepper 3 springs, 2 at L4, 1 at L3, 2x10 each (standing on 2in step)  Side steps with G sport loop x2 laps (knees straight)   Shuttle B LE 4 bands 3x10  Shuttle 2-1 with 3 bands 2x10 L  Sit-stand from raised mat 2 x 10 (mirror for visual cues for equal WB)  gastroc stretch on incline 3 x 20 sec  Forward step up on 6" step with contralateral hip flex 2x10  Lateral step downs 6in 2x10 (cues for increased knee flexion to avoid hip dropping on R)  Pt ascended/descended 4 steps with 1 rail x3 trials, reciprocal pattern, mod I (vc for control on descent)    Patient received manual techniques including superior and inferior patellar glides, STM to distal patellar tendon and fat pad x8min    Pt. Received cold pack x 10 min. To L knee. Following treatment.    Written Home Exercises Provided: Patient to continue with current written HEP, instruction for self-mobilizations of L patella for superior glide.  Pt demo good understanding of the education provided. Marlee demonstrated good return demonstration " of activities.     Assessment:     Marlee tolerated treatment well this date. Improved patellar mobility and distal quad activation following manual techniques. Pt continues with weakness during eccentric movement with mild lateral weight shift toward R with sit-stand.  She reported 0/10 pain to L knee post tx.   Pt will continue to benefit from skilled PT intervention to address the remaining functional deficits, maximize pt's level of independence in the home and community environment, as well as Pain limits function of effected part for some activities, Unable to participate fully in daily activities and Weakness.    Patient is making good progress towards established goals.    Short Term Goals:  4 weeks  1. Pt will report decreased knee pain to 4/10 in order to increase tolerance for stairs.(met)  2. Pt will increase L knee flexion ROM by at least 5-10 degrees in order to show improved functional mobility. (met)  3. Increased LE strength by 1/3 muscle grade in all deficient planes for increased ease with sit to stand (met)  4. Pt will report ability to sleep through the night without pain for increased tolerance for daily activities. (met)  5. Pt will be independent and consistent with issued HEP in order to show carryover between therapy sessions.(met)     Long Term Goals: 12 weeks (progressing)  6. Pt will report decreased knee pain to 0-2/10 in order to increase tolerance for exercising at gym. (not yet met)  7. Pt will increase L knee flexion ROM by at least 5-10 degrees in order to show improved functional mobility. (met)  8. Increased LE strength to 4+/5 to 5/5 in all deficient planes for increased ease with floor to stand transfers. (partially met)  9. Pt will report ability to sleep through the night without pain for increased tolerance for daily activities. (met)  10. Pt will be independent and consistent with issued HEP in order to show carryover between therapy sessions.  Pt will perform floor to  stand transfer independently without L knee pain. (not yet met)    Unmet goals continue to remain appropriate within 8 weeks.    Plan:  Continue with established Plan of Care towards PT goals.

## 2018-06-13 ENCOUNTER — CLINICAL SUPPORT (OUTPATIENT)
Dept: REHABILITATION | Facility: HOSPITAL | Age: 53
End: 2018-06-13
Payer: COMMERCIAL

## 2018-06-13 DIAGNOSIS — M62.81 MUSCLE WEAKNESS: ICD-10-CM

## 2018-06-13 DIAGNOSIS — M25.562 ACUTE PAIN OF LEFT KNEE: ICD-10-CM

## 2018-06-13 PROCEDURE — 97140 MANUAL THERAPY 1/> REGIONS: CPT | Mod: PN

## 2018-06-13 PROCEDURE — 97110 THERAPEUTIC EXERCISES: CPT | Mod: PN

## 2018-06-13 NOTE — PROGRESS NOTES
"Name: Marlee Jones  Clinic Number: [id number]  Date of Treatment: 06/13/2018   Diagnosis:   Encounter Diagnoses   Name Primary?    Acute pain of left knee     Muscle weakness          Start date: 5/2/2018  POC End date: 7/25/2018    Time in: 2:00  Time Out: 3:12  Total Treatment Time: 60  1:1 Time: 60  Visit number: 13    Precautions: standard    Subjective:    Marlee reports 0/10 pain to L knee today. States she feels and overall improvement, but still has limitations with getting up from sitting, deep squatting, and kneeling on the L knee.    Objective    Marlee instructed in and performed therapeutic exercises to develop strength, endurance and flexibility for 50 minutes including:     SL clams 2x10 G sport loop  Bridges with G sport loop for hip abd iso 3x10   Quad set  10 x 5" with tactile cues for VMO activation  SLR 3x10 1#  L  NP - SL hip abd 2x10 each  Prone SLR (pillow under hips) 2 x 10 each 1#    pilates stepper 3 springs, 2 at L4, 1 at L3, 2x10 each (standing on 2in step)  Side steps with G sport loop x2 laps (knees straight)   Shuttle B LE 4 bands 3x10  Shuttle 2-1 with 3 bands 2x10 alternating  Sit-stand from [employer] chair x15 (mirror for visual cues for equal WB)  gastroc stretch on incline 3 x 20 sec  Forward step up on 6" step with contralateral hip flex 2x10  Lateral step downs 6in 2x10 (cues for increased knee flexion to avoid hip dropping on R)  Pt ascended/descended 4 steps with 1 rail x4 trials, reciprocal pattern, mod I (vc for control on descent)    Patient received manual techniques including superior and inferior patellar glides, STM and IASTM to distal patellar tendon and fat pad, STM to lateral L gastroc x10 min    Pt. Received cold pack x 10 min. To L knee. Following treatment.    Written Home Exercises Provided: Patient to continue with current written HEP, instruction for self-mobilizations of L patella for superior glide.  Pt demo good understanding of the education " yolyRegan Whalen demonstrated good return demonstration of activities.     Assessment:     Pt with overall good tolerance to treatment without onset of pain or soreness. She presents this date with decreased L gastroc tightness and improved patellar mobility into superior glide with quad activation. Mild tenderness continues to distal patellar tendon with superior glide, but continues to improve. Able to perform sit-stand transfers from standard chair without extra cushion however required rest breaks and frequent cues to maintain proper body mechanics and control speed of movement.  Pt continues with primary weakness during eccentric and deceleration movements such as descending stairs or sitting down in a chair.   Pt will continue to benefit from skilled PT intervention to address the remaining functional deficits, maximize pt's level of independence in the home and community environment, as well as Pain limits function of effected part for some activities, Unable to participate fully in daily activities and Weakness.    Patient is making good progress towards established goals.    Short Term Goals:  4 weeks  1. Pt will report decreased knee pain to 4/10 in order to increase tolerance for stairs.(met)  2. Pt will increase L knee flexion ROM by at least 5-10 degrees in order to show improved functional mobility. (met)  3. Increased LE strength by 1/3 muscle grade in all deficient planes for increased ease with sit to stand (met)  4. Pt will report ability to sleep through the night without pain for increased tolerance for daily activities. (met)  5. Pt will be independent and consistent with issued HEP in order to show carryover between therapy sessions.(met)     Long Term Goals: 12 weeks (progressing)  6. Pt will report decreased knee pain to 0-2/10 in order to increase tolerance for exercising at gym. (not yet met)  7. Pt will increase L knee flexion ROM by at least 5-10 degrees in order to show improved  functional mobility. (met)  8. Increased LE strength to 4+/5 to 5/5 in all deficient planes for increased ease with floor to stand transfers. (partially met)  9. Pt will report ability to sleep through the night without pain for increased tolerance for daily activities. (met)  10. Pt will be independent and consistent with issued HEP in order to show carryover between therapy sessions.  Pt will perform floor to stand transfer independently without L knee pain. (not yet met)    Unmet goals continue to remain appropriate within 8 weeks.    Plan:  Continue with established Plan of Care towards PT goals.

## 2018-06-18 ENCOUNTER — CLINICAL SUPPORT (OUTPATIENT)
Dept: REHABILITATION | Facility: HOSPITAL | Age: 53
End: 2018-06-18
Payer: COMMERCIAL

## 2018-06-18 DIAGNOSIS — M62.81 MUSCLE WEAKNESS: ICD-10-CM

## 2018-06-18 DIAGNOSIS — M25.562 ACUTE PAIN OF LEFT KNEE: ICD-10-CM

## 2018-06-18 PROCEDURE — 97110 THERAPEUTIC EXERCISES: CPT | Mod: PN

## 2018-06-18 NOTE — PROGRESS NOTES
"Name: Marlee Jones  Clinic Number: [id number]  Date of Treatment: 06/18/2018   Diagnosis:   Encounter Diagnoses   Name Primary?    Acute pain of left knee     Muscle weakness          Start date: 5/2/2018  POC End date: 7/25/2018    Time in: 2:00  Time Out: 3:12  Total Treatment Time: 60  1:1 Time: 60  Visit number: 14    Precautions: standard    Subjective:    Marlee reports 0/10 pain to L knee today. She states she still feels some soreness at times.  Pt states she still has difficulty with sit to stand and stairs and is very cautious.    Objective  L knee ROM: 2 hyperextension, 140 flexion    Lower Extremity Strength  Right LE  Left LE    Knee extension: 5/5 Knee extension: 4+/5   Knee flexion: 5/5 Knee flexion: 5/5   Hip flexion: 5/5 Hip flexion: 5/5   Hip extension:  5/5 Hip extension: 5/5   Hip abduction: 5/5 Hip abduction: 5/5   Hip adduction: 5/5 Hip adduction 5/5   Ankle dorsiflexion: 5/5 Ankle dorsiflexion: 5/5   Ankle plantarflexion: 5/5 Ankle plantarflexion: 5/5     Marlee instructed in and performed therapeutic exercises to develop strength, endurance and flexibility for 55 minutes including:     SL clams 2x10 G sport loop  Bridges with G sport loop for hip abd iso 3x10   Quad set  10 x 5" with tactile cues for VMO activation  SLR 2x10 2#  L  pilates stepper 4 springs (2 on L4, 1 on L3, 1 on L1) , 2x10 each (standing on 2in step)  Side steps with G sport loop x2 laps (knees straight)   Shuttle B LE 4 bands 3x10  Shuttle 2-1 with 3.5 bands 2x10 alternating  Sit-stand from standard chair 1x10-- pt reported L knee pain so performed from elevated mat 1x10 without pain (mirror for visual cues for equal WB)  gastroc stretch on incline 3 x 20 sec  Forward step up on 6" step with contralateral hip flex 2x10  Lateral step downs 6in 2x10 (cues for increased knee flexion to avoid hip dropping on R)  Pt ascended/descended 4 steps with 1 rail x4 trials, reciprocal pattern, mod I (vc for control on " descent)    Patient received manual techniques including superior and inferior patellar glides x2 min    Pt. Received cold pack x 10 min. To L knee. Following treatment.    Written Home Exercises Provided: Patient to continue with current written HEP, instruction for self-mobilizations of L patella for superior glide.  Pt demo good understanding of the education provided. Marlee demonstrated good return demonstration of activities.     Assessment:     Pt tolerated tx session well reporting 0/10 pain to L knee post tx.  She continues to improve with increased L LE strength.  Pt continues to have most difficulty with sit to stand and descending stairs due to quad weakness and decreased eccentric quad control.  She reported 6/10 pain initially with sit to stand from chair, but relieved when performed on raised mat about 1/2inch higher than seat height.  Pt will continue to benefit from skilled PT intervention to address the remaining functional deficits, maximize pt's level of independence in the home and community environment, as well as Pain limits function of effected part for some activities, Unable to participate fully in daily activities and Weakness.    Patient is making good progress towards established goals.    Short Term Goals:  4 weeks  1. Pt will report decreased knee pain to 4/10 in order to increase tolerance for stairs.(met)  2. Pt will increase L knee flexion ROM by at least 5-10 degrees in order to show improved functional mobility. (met)  3. Increased LE strength by 1/3 muscle grade in all deficient planes for increased ease with sit to stand (met)  4. Pt will report ability to sleep through the night without pain for increased tolerance for daily activities. (met)  5. Pt will be independent and consistent with issued HEP in order to show carryover between therapy sessions.(met)     Long Term Goals: 12 weeks (progressing)  6. Pt will report decreased knee pain to 0-2/10 in order to increase  tolerance for exercising at gym. (not yet met)  7. Pt will increase L knee flexion ROM by at least 5-10 degrees in order to show improved functional mobility. (met)  8. Increased LE strength to 4+/5 to 5/5 in all deficient planes for increased ease with floor to stand transfers. ( met)  9. Pt will report ability to sleep through the night without pain for increased tolerance for daily activities. (met)  10. Pt will be independent and consistent with issued HEP in order to show carryover between therapy sessions.  Pt will perform floor to stand transfer independently without L knee pain. (not yet met)    Unmet goals continue to remain appropriate within 4 weeks.    Plan:  Continue with established Plan of Care towards PT goals.     PT/PTA met face to face to discuss patient's treatment plan and progress towards established goals.  Treatment will be continued as described in initial report/eval and progress notes.  Patient will be seen by physical therapist every sixth visit and minimally once per month.

## 2018-07-02 ENCOUNTER — CLINICAL SUPPORT (OUTPATIENT)
Dept: REHABILITATION | Facility: HOSPITAL | Age: 53
End: 2018-07-02
Payer: COMMERCIAL

## 2018-07-02 DIAGNOSIS — M62.81 MUSCLE WEAKNESS: ICD-10-CM

## 2018-07-02 DIAGNOSIS — M25.562 ACUTE PAIN OF LEFT KNEE: ICD-10-CM

## 2018-07-02 PROCEDURE — 97110 THERAPEUTIC EXERCISES: CPT | Mod: PN

## 2018-07-02 NOTE — PROGRESS NOTES
"Name: Marlee Jones  Clinic Number: [id number]  Date of Treatment: 07/02/2018   Diagnosis:   Encounter Diagnoses   Name Primary?    Acute pain of left knee     Muscle weakness          Start date: 5/2/2018  POC End date: 7/25/2018    Time in: 9:00  Time Out: 10:08  Total Treatment Time: 55  1:1 Time: 55  Visit number: 15    Precautions: standard    Subjective:    Marlee reports having fallen over the weekend while getting out of the car due to planting on her foot wrong. States she did not land on her L knee, but may have had some soreness. Some scrapes on the R knee.  Reports 0/10 pain to L knee today. States last week she did go back to the gym, but had to modify a lot of the exercises due to weakness, especially lunges as well as step ups on a high step (approx 12 inches).    Objective    Marlee instructed in and performed therapeutic exercises to develop strength, endurance and flexibility for 55 minutes including:     SL clams 3x10 G sport loop  Bridges with G sport loop for hip abd iso 3x10   Quad set  20 x 3" with tactile cues for VMO activation  SLR 2x10 2#  L  pilates stepper 4 springs (2 on L4, 1 on L3, 1 on L1) , 2x10 each (standing on 2in step)  Side steps with G sport loop x2 laps (knees straight)   Shuttle B LE 4.5 bands 3x10  Shuttle 2-1 with 3.5 bands 2x10 alternating  Sit-stand from standard chair -- pt reported L knee pain so performed from elevated mat 2x10 without pain (mirror for visual cues for equal WB)  gastroc stretch on incline 3 x 20 sec  Single heel raise 2x10 each  Forward step up on 8" step with contralateral hip flex 2x10 (possibly try 10" step next session)  Lateral step downs 6in 2x10 (cues for increased knee flexion to avoid hip dropping on R)  Pt ascended/descended 4 steps with 1 rail x4 trials, reciprocal pattern, mod I (vc for control on descent)    Patient received manual techniques including superior and inferior patellar glides x2 min    Pt. Received cold pack x 10 min. " To L knee. Following treatment.    Written Home Exercises Provided: Patient to continue with current written HEP, instruction for self-mobilizations of L patella for superior glide.  Pt demo good understanding of the education provided. Marlee demonstrated good return demonstration of activities.     Assessment:     Pt tolerated tx session well reporting 0/10 pain to L knee post tx. Pt was able to perform progression of several exercises without onset of pain or soreness. Continued discomfort with sit-stand from standard chair and required slight elevated mat to perform pain free. Able to do forward step up on 8 inch step without L knee pain, cues for soft landing on step down with eccentric control.   Pt will continue to benefit from skilled PT intervention to address the remaining functional deficits, maximize pt's level of independence in the home and community environment, as well as Pain limits function of effected part for some activities, Unable to participate fully in daily activities and Weakness.    Patient is making good progress towards established goals.    Short Term Goals:  4 weeks  1. Pt will report decreased knee pain to 4/10 in order to increase tolerance for stairs.(met)  2. Pt will increase L knee flexion ROM by at least 5-10 degrees in order to show improved functional mobility. (met)  3. Increased LE strength by 1/3 muscle grade in all deficient planes for increased ease with sit to stand (met)  4. Pt will report ability to sleep through the night without pain for increased tolerance for daily activities. (met)  5. Pt will be independent and consistent with issued HEP in order to show carryover between therapy sessions.(met)     Long Term Goals: 12 weeks (progressing)  6. Pt will report decreased knee pain to 0-2/10 in order to increase tolerance for exercising at gym. (not yet met)  7. Pt will increase L knee flexion ROM by at least 5-10 degrees in order to show improved functional mobility.  (met)  8. Increased LE strength to 4+/5 to 5/5 in all deficient planes for increased ease with floor to stand transfers. ( met)  9. Pt will report ability to sleep through the night without pain for increased tolerance for daily activities. (met)  10. Pt will be independent and consistent with issued HEP in order to show carryover between therapy sessions.  Pt will perform floor to stand transfer independently without L knee pain. (not yet met)    Unmet goals continue to remain appropriate within 4 weeks.    Plan:  Continue with established Plan of Care towards PT goals.     PT/PTA met face to face to discuss patient's treatment plan and progress towards established goals.  Treatment will be continued as described in initial report/eval and progress notes.  Patient will be seen by physical therapist every sixth visit and minimally once per month.

## 2018-07-16 ENCOUNTER — CLINICAL SUPPORT (OUTPATIENT)
Dept: REHABILITATION | Facility: HOSPITAL | Age: 53
End: 2018-07-16
Payer: COMMERCIAL

## 2018-07-16 DIAGNOSIS — M62.81 MUSCLE WEAKNESS: ICD-10-CM

## 2018-07-16 DIAGNOSIS — M25.562 ACUTE PAIN OF LEFT KNEE: ICD-10-CM

## 2018-07-16 PROCEDURE — 97110 THERAPEUTIC EXERCISES: CPT | Mod: PN

## 2018-07-16 NOTE — PROGRESS NOTES
"Name: Marlee Jones  Clinic Number: [id number]  Date of Treatment: 07/16/2018   Diagnosis:   Encounter Diagnoses   Name Primary?    Acute pain of left knee     Muscle weakness          Start date: 5/2/2018  POC End date: 7/25/2018    Time in: 9:04  Time Out: 10:13  Total Treatment Time: 55  1:1 Time: 55  Visit number: 16    Precautions: standard    Subjective:    Marlee reports her knee did well on vacation while being out of town; had to climb stairs every day but was pain free and was able to walk in the sand. Has been practicing with getting out of chairs as well, still a little difficulty and mild apprehension due to anticipation of pain. Reports 0/10 pain to L knee today.    Objective    Marlee instructed in and performed therapeutic exercises to develop strength, endurance and flexibility for 55 minutes including:     SL clams 3x10 G sport loop  Bridges with G sport loop for hip abd iso 3x10   Quad set  20 x 3" with tactile cues for VMO activation  SLR 2x10 2#  L  pilates stepper 4 springs (2 on L4, 1 on L3, 1 on L1) , 2x10 each (standing on 2in step)  Side steps with G sport loop x2 laps (knees straight)   Shuttle B LE 4.5 bands 3x10  Shuttle 2-1 with 3.5 bands 2x10 alternating  Sit-stand from 18in platform + airex pad 2x10 without pain  gastroc stretch on incline 3 x 20 sec  Single heel raise 2x10 each  Forward step up on 10" step with contralateral hip flex 2x10  Lateral step downs 6in 2x10 (cues for increased knee flexion to avoid hip dropping on R)  Pt ascended/descended 4 steps with 1 rail x3 trials, reciprocal pattern, mod I (vc for control on descent)    NP - Patient received manual techniques including superior and inferior patellar glides x2 min    Pt. Received cold pack x 10 min. To L knee. Following treatment.    Written Home Exercises Provided: Patient to continue with current written HEP, instruction for self-mobilizations of L patella for superior glide.  Pt demo good understanding of the " "education provided. Marlee demonstrated good return demonstration of activities.     Assessment:     Marlee demonstrated good tolerance to treatment. Pt was able to perform forward step up on 10'' step without pain into L knee, only mild fatigue. No pain from sit-stands on 18" platform and foam pad. LE strength and endurance continues to improve, per pt most of her limitations come from self-limiting due to mental fear of falling or re-injury. Pt reported 0/10 pain to L knee post tx.   Pt will continue to benefit from skilled PT intervention to address the remaining functional deficits, maximize pt's level of independence in the home and community environment, as well as Pain limits function of effected part for some activities, Unable to participate fully in daily activities and Weakness.    Patient is making good progress towards established goals.    Short Term Goals:  4 weeks  1. Pt will report decreased knee pain to 4/10 in order to increase tolerance for stairs.(met)  2. Pt will increase L knee flexion ROM by at least 5-10 degrees in order to show improved functional mobility. (met)  3. Increased LE strength by 1/3 muscle grade in all deficient planes for increased ease with sit to stand (met)  4. Pt will report ability to sleep through the night without pain for increased tolerance for daily activities. (met)  5. Pt will be independent and consistent with issued HEP in order to show carryover between therapy sessions.(met)     Long Term Goals: 12 weeks (progressing)  6. Pt will report decreased knee pain to 0-2/10 in order to increase tolerance for exercising at gym. (not yet met)  7. Pt will increase L knee flexion ROM by at least 5-10 degrees in order to show improved functional mobility. (met)  8. Increased LE strength to 4+/5 to 5/5 in all deficient planes for increased ease with floor to stand transfers. ( met)  9. Pt will report ability to sleep through the night without pain for increased tolerance " for daily activities. (met)  10. Pt will be independent and consistent with issued HEP in order to show carryover between therapy sessions.  Pt will perform floor to stand transfer independently without L knee pain. (not yet met)    Unmet goals continue to remain appropriate within 4 weeks.    Plan:    Continue with established Plan of Care towards PT goals.

## 2019-07-22 PROBLEM — R79.89 ELEVATED TROPONIN: Status: ACTIVE | Noted: 2019-07-22

## 2019-07-22 PROBLEM — I26.99 ACUTE PULMONARY EMBOLISM: Status: ACTIVE | Noted: 2019-07-22

## 2019-07-22 PROBLEM — D68.51 FACTOR 5 LEIDEN MUTATION, HETEROZYGOUS: Status: ACTIVE | Noted: 2019-07-22

## 2019-07-22 PROBLEM — I82.403 ACUTE DEEP VEIN THROMBOSIS (DVT) OF BOTH LOWER EXTREMITIES: Status: ACTIVE | Noted: 2019-07-22

## 2019-07-22 PROBLEM — I26.99 BILATERAL PULMONARY EMBOLISM: Status: ACTIVE | Noted: 2019-07-22

## 2019-07-24 PROBLEM — I26.09 PULMONARY EMBOLISM WITH ACUTE COR PULMONALE: Status: ACTIVE | Noted: 2019-07-22

## 2021-04-29 ENCOUNTER — PATIENT MESSAGE (OUTPATIENT)
Dept: RESEARCH | Facility: HOSPITAL | Age: 56
End: 2021-04-29